# Patient Record
Sex: FEMALE | Race: WHITE | NOT HISPANIC OR LATINO | Employment: OTHER | URBAN - METROPOLITAN AREA
[De-identification: names, ages, dates, MRNs, and addresses within clinical notes are randomized per-mention and may not be internally consistent; named-entity substitution may affect disease eponyms.]

---

## 2023-01-01 ENCOUNTER — PATIENT OUTREACH (OUTPATIENT)
Dept: ONCOLOGY | Facility: CLINIC | Age: 83
End: 2023-01-01
Payer: MEDICARE

## 2023-01-01 ENCOUNTER — TRANSCRIBE ORDERS (OUTPATIENT)
Dept: OTHER | Age: 83
End: 2023-01-01

## 2023-01-01 DIAGNOSIS — C54.1 ENDOMETRIAL CANCER (H): Primary | ICD-10-CM

## 2023-01-01 DIAGNOSIS — N89.8 VAGINAL MASS: ICD-10-CM

## 2023-11-02 NOTE — PROGRESS NOTES
New Patient Hematology / Oncology Nurse Navigator Note     Referral Date: 11/1/23    Referring provider: Self-referred by pt's daughter Janelle:  per Daughter call/   vaginal mass, endometrial cancer/   seen by [facility] in [place]- Dr Joelle Guillaume/   to see Med Onc patient has appt there 11/10,   wants to be seen sooner, start chemo   Patient had biopsy already and they inserted kidney stents   For scheduling call sree Luis [phone]       Referring Clinic/Organization: Self Referred     Referred to: GynOnc    Requested provider (if applicable): First available - did not specify     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   10/24/23 Op Note: BIOPSY OF VAGINA,SIMPLE EXAM UNDER ANESTHESIA, biopsy of vaginal mass (Dr. Guillaume)--BOOKMARKED  10/18/23 CT CAP:  IMPRESSION:   1. Large heterogeneous enhancing mass that appears to arise from the vagina that measures approximately 9.9 x 7.1 x 12.0 cm (AP/Trans/CC). Portions of the mass likely involves the adjacent rectosigmoid colon. Close approximation with possible local invasion into the urinary bladder (series 16 image 190/209). Soft tissue prominence involving the posterior rectum that likely represents locally invasive disease (series 16 image 203/209).   2. Multiple hepatic metastases scattered throughout both lobes of the liver.   3. Numerous bilateral pulmonary nodules with the largest nodules within the left and right lung measuring 1.1 cm both are best visualized on series 8 image 48/105.   4. Bilateral hydronephrosis, left greater than right secondary to the mass effect on the lower pelvic structures.  -- BOOKMARKED    8/23/16 Path:      Clinical Assessment / Barriers to Care (Per Nurse):  Pt lives in [facility]     Records Location: Care  Everywhere     Records Needed:   Records from [facility] per protocol    Additional testing needed prior to consult:   N/A    Referral updates and Plan:   OUTGOING CALL to pt's daughter Janelle, no answer.     LVM introducing my role as nurse navigator with [facility] and that we have recd the self-referral to GynOnc. Requested call back to discuss goal of consult. Will recommend keeping appointments with [facility] as scheduled 11/10 with Dr. Guillaume/Kanika and arranging [facility] if patient/Janelle is agreeable so as not to delay care since she already has an established care team and they are working on a plan of care.          Sanjana Jimenez, BSN, RN, PHN, OCN  Hematology/Oncology Nurse Navigator  [facility]  [phone]